# Patient Record
Sex: FEMALE | Race: WHITE | NOT HISPANIC OR LATINO | Employment: STUDENT | URBAN - METROPOLITAN AREA
[De-identification: names, ages, dates, MRNs, and addresses within clinical notes are randomized per-mention and may not be internally consistent; named-entity substitution may affect disease eponyms.]

---
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
## 2025-03-13 ENCOUNTER — OFFICE VISIT (OUTPATIENT)
Dept: OBSTETRICS AND GYNECOLOGY | Facility: CLINIC | Age: 20
End: 2025-03-13
Payer: COMMERCIAL

## 2025-03-13 VITALS
WEIGHT: 143 LBS | HEIGHT: 67 IN | DIASTOLIC BLOOD PRESSURE: 72 MMHG | SYSTOLIC BLOOD PRESSURE: 122 MMHG | BODY MASS INDEX: 22.44 KG/M2

## 2025-03-13 DIAGNOSIS — Z30.431 IUD CHECK UP: Primary | ICD-10-CM

## 2025-03-13 DIAGNOSIS — R10.32 LLQ PAIN: ICD-10-CM

## 2025-03-13 NOTE — PROGRESS NOTES
Chief Complaint   Patient presents with    Follow-up         Subjective   HPI  Charlotte Perez is a 20 y.o. female, , who presents for follow up evaluation of ovarian cyst.      Her last LMP was No LMP recorded. She was last seen on 2024. At that time the plan was expectant management for management of the cyst. Since her last visit she admits pelvic pain. She states that her pelvic pain is intermittent, sharp, and localized to the LLQ. The patient reports additional symptoms as none.      Did the patient have u/s today? Yes. TVUS revealed an endometrial lining measuring 4.6MM and IUCD placed at the fundus of the uterus.     Additional OB/GYN History   Last Pap :   Last Completed Pap Smear    This patient has no relevant Health Maintenance data.       History of abnormal Pap smear:  N/A  Tobacco Usage?: No      Current Outpatient Medications:     baclofen (LIORESAL) 10 MG tablet, Take 1 tablet by mouth As Needed for Muscle Spasms. May place vaginally, Disp: 30 tablet, Rfl: 1    ibuprofen (ADVIL,MOTRIN) 800 MG tablet, Take 1 tablet by mouth Every 8 (Eight) Hours As Needed for Mild Pain., Disp: 30 tablet, Rfl: 0    Levonorgestrel (KYLEENA IU), by Intrauterine route., Disp: , Rfl:      Past Medical History:   Diagnosis Date    Anemia     IUD (intrauterine device) in place 2023    escobar        Past Surgical History:   Procedure Laterality Date    WISDOM TOOTH EXTRACTION         The additional following portions of the patient's history were reviewed and updated as appropriate: allergies and current medications.    Review of Systems   Constitutional: Negative.    HENT: Negative.     Eyes: Negative.    Respiratory: Negative.     Cardiovascular: Negative.    Gastrointestinal: Negative.    Endocrine: Negative.    Genitourinary:  Positive for pelvic pain.   Musculoskeletal: Negative.    Skin: Negative.    Allergic/Immunologic: Negative.    Neurological: Negative.    Hematological: Negative.   "  Psychiatric/Behavioral: Negative.       All other systems reviewed and are negative.     I have reviewed and agree with the HPI, ROS, and historical information as entered above. Roxie Cota MD      /72   Ht 170.2 cm (67\")   Wt 64.9 kg (143 lb)   BMI 22.40 kg/m²     Physical Exam  Constitutional:       Appearance: She is well-developed.   HENT:      Head: Normocephalic.   Eyes:      Conjunctiva/sclera: Conjunctivae normal.   Pulmonary:      Effort: Pulmonary effort is normal.   Psychiatric:         Behavior: Behavior normal.         Assessment & Plan         Previous cyst has resolved.  IUD correct place.  Int LLQ pain - discussed options and potential etiologies.  For now she will watch.  Options would be to remove IUD and or consider laparoscopy for endometriosis.      Roxie Cota MD  03/13/2025   "

## 2025-05-05 ENCOUNTER — TRANSCRIBE ORDERS (OUTPATIENT)
Dept: ADMINISTRATIVE | Facility: HOSPITAL | Age: 20
End: 2025-05-05
Payer: COMMERCIAL

## 2025-05-05 ENCOUNTER — HOSPITAL ENCOUNTER (OUTPATIENT)
Dept: GENERAL RADIOLOGY | Facility: HOSPITAL | Age: 20
Discharge: HOME | End: 2025-05-05
Admitting: FAMILY MEDICINE
Payer: COMMERCIAL

## 2025-05-05 DIAGNOSIS — R10.32 LLQ PAIN: Primary | ICD-10-CM

## 2025-05-05 PROCEDURE — 74018 RADEX ABDOMEN 1 VIEW: CPT
